# Patient Record
Sex: FEMALE | Race: WHITE | HISPANIC OR LATINO | URBAN - METROPOLITAN AREA
[De-identification: names, ages, dates, MRNs, and addresses within clinical notes are randomized per-mention and may not be internally consistent; named-entity substitution may affect disease eponyms.]

---

## 2024-01-19 ENCOUNTER — OFFICE VISIT (OUTPATIENT)
Dept: URBAN - METROPOLITAN AREA CLINIC 78 | Facility: CLINIC | Age: 24
End: 2024-01-19
Payer: COMMERCIAL

## 2024-01-19 ENCOUNTER — OFFICE VISIT (OUTPATIENT)
Dept: URBAN - METROPOLITAN AREA CLINIC 78 | Facility: CLINIC | Age: 24
End: 2024-01-19

## 2024-01-19 ENCOUNTER — DASHBOARD ENCOUNTERS (OUTPATIENT)
Age: 24
End: 2024-01-19

## 2024-01-19 VITALS
HEIGHT: 63 IN | TEMPERATURE: 98.1 F | HEART RATE: 77 BPM | WEIGHT: 179.8 LBS | BODY MASS INDEX: 31.86 KG/M2 | DIASTOLIC BLOOD PRESSURE: 78 MMHG | SYSTOLIC BLOOD PRESSURE: 118 MMHG

## 2024-01-19 DIAGNOSIS — K62.5 RECTUM BLEEDING: ICD-10-CM

## 2024-01-19 DIAGNOSIS — R79.0 LOW FERRITIN: ICD-10-CM

## 2024-01-19 PROCEDURE — 99244 OFF/OP CNSLTJ NEW/EST MOD 40: CPT | Performed by: INTERNAL MEDICINE

## 2024-01-19 PROCEDURE — 99204 OFFICE O/P NEW MOD 45 MIN: CPT | Performed by: INTERNAL MEDICINE

## 2024-01-19 RX ORDER — SODIUM PICOSULFATE, MAGNESIUM OXIDE, AND ANHYDROUS CITRIC ACID 12; 3.5; 1 G/175ML; G/175ML; MG/175ML
175 ML THE FIRST DOSE THE EVENING BEFORE AND SECOND DOSE THE MORNING OF COLONOSCOPY LIQUID ORAL ONCE A DAY
Qty: 350 | OUTPATIENT
Start: 2024-01-19 | End: 2024-01-21

## 2024-01-19 NOTE — HPI-TODAY'S VISIT:
Patient was referred by Dr. Madeline Dallas  A copy of this document will be sent to the physician.   PatientIs a [id number]-old [place] female seen in consultation for rectal bleeding.  Her symptoms are intermittent over the last 6 years.  Bleeding is described as bright red per rectum not mixed in stool but may be dots around or drops around.  Her bowels are generally regular but occasionally constipation.  There is no family history of colon cancer or colon polyps.  There is no family history of Crohn's colitis.  Her labs were a month ago which revealed low iron and vitamin D deficiency.  In general she has regular cycles but the only time she had heavy cycles was after using Plan B birth control pills.  Patient states that  anemia runs in family from maternal side  CBC: Hg 12.3  Ferritin is 4  Iron sat is 9   PICA present  Currently on oral iron

## 2024-01-25 ENCOUNTER — OUT OF OFFICE VISIT (OUTPATIENT)
Dept: URBAN - METROPOLITAN AREA SURGERY CENTER 15 | Facility: SURGERY CENTER | Age: 24
End: 2024-01-25
Payer: COMMERCIAL

## 2024-01-25 DIAGNOSIS — K62.5 RECTAL BLEEDING: ICD-10-CM

## 2024-01-25 DIAGNOSIS — R79.0 LOW FERRITIN: ICD-10-CM

## 2024-01-25 DIAGNOSIS — K63.3 APHTHOUS ULCER OF ILEUM: ICD-10-CM

## 2024-01-25 DIAGNOSIS — K52.89 OTHER SPECIFIED NONINFECTIVE GASTROENTERITIS AND COLITIS: ICD-10-CM

## 2024-01-25 DIAGNOSIS — K64.9 HEMORRHOIDS: ICD-10-CM

## 2024-01-25 DIAGNOSIS — K62.5 RECTUM BLEEDING: ICD-10-CM

## 2024-01-25 PROCEDURE — 45380 COLONOSCOPY AND BIOPSY: CPT | Performed by: INTERNAL MEDICINE

## 2024-01-25 PROCEDURE — G8907 PT DOC NO EVENTS ON DISCHARG: HCPCS | Performed by: INTERNAL MEDICINE

## 2024-01-25 PROCEDURE — 00811 ANES LWR INTST NDSC NOS: CPT | Performed by: NURSE ANESTHETIST, CERTIFIED REGISTERED
